# Patient Record
Sex: FEMALE | Race: WHITE | NOT HISPANIC OR LATINO
[De-identification: names, ages, dates, MRNs, and addresses within clinical notes are randomized per-mention and may not be internally consistent; named-entity substitution may affect disease eponyms.]

---
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
## 2020-03-12 ENCOUNTER — APPOINTMENT (OUTPATIENT)
Dept: PULMONOLOGY | Facility: CLINIC | Age: 20
End: 2020-03-12
Payer: COMMERCIAL

## 2020-03-12 VITALS
OXYGEN SATURATION: 100 % | BODY MASS INDEX: 17.61 KG/M2 | WEIGHT: 123 LBS | RESPIRATION RATE: 16 BRPM | TEMPERATURE: 96.7 F | HEART RATE: 62 BPM | DIASTOLIC BLOOD PRESSURE: 69 MMHG | SYSTOLIC BLOOD PRESSURE: 114 MMHG | HEIGHT: 70 IN

## 2020-03-12 DIAGNOSIS — Z78.9 OTHER SPECIFIED HEALTH STATUS: ICD-10-CM

## 2020-03-12 DIAGNOSIS — G47.21 CIRCADIAN RHYTHM SLEEP DISORDER, DELAYED SLEEP PHASE TYPE: ICD-10-CM

## 2020-03-12 PROCEDURE — 99204 OFFICE O/P NEW MOD 45 MIN: CPT

## 2020-03-16 PROBLEM — Z78.9 DOES NOT USE TOBACCO: Status: ACTIVE | Noted: 2020-03-16

## 2020-03-16 PROBLEM — G47.21 SLEEP-WAKE SCHEDULE DISORDER, DELAYED PHASE TYPE: Status: ACTIVE | Noted: 2020-03-16

## 2020-03-16 RX ORDER — DEXTROAMPHETAMINE SACCHARATE, AMPHETAMINE ASPARTATE, DEXTROAMPHETAMINE SULFATE, AND AMPHETAMINE SULFATE 3.75; 3.75; 3.75; 3.75 MG/1; MG/1; MG/1; MG/1
TABLET ORAL
Refills: 0 | Status: ACTIVE | COMMUNITY

## 2020-03-16 NOTE — HISTORY OF PRESENT ILLNESS
[Other: ___] : [unfilled] [Daytime Somnolence] : daytime somnolence [ESS: ___] : ESS score [unfilled] [Unintentional Sleep While Inactive] : unintentional sleep while inactive [Awakes Unrefreshed] : awakening unrefreshed [Hypersomnolence] : hypersomnolence [FreeTextEntry1] : This is a 20-year-old female with no significant past medical history who comes in for evaluation of hypersomnolence. The patient is accompanied by her mother. The patient states that for significant amount of time she's experienced some degree of daytime sleepiness. This goes as far back as a middle school where it was difficult to get her up and in time for  class. When she is able to sleep when she wants she prefers a later bedtime between 1 AM and 3 AM. At times she is able to sleep until 9 or 10:00 AM and feels a little more refreshed. If she is awoken around 6 or 7 AM, she feels groggy and is difficult to awaken.\par \par She has seen a psychologist and psychiatrist and was prescribed antidepressant medication which helped for short while. She ultimately discontinued this medication as he did not have a lasting effect. She was given a prescription for Adderall when she needs to perform at her highest level. [Snoring] : no snoring [Witnessed Apneas] : no witnessed sleep apnea [Frequent Nocturnal Awakening] : no nocturnal awakening [Unintentional Sleep while Active] : no unintentional sleep while active [Awakes with Headache] : no headache upon awakening [Awakening With Dry Mouth] : no dry mouth upon awakening [Recent  Weight Gain] : no recent weight gain [DIS] : no DIS [DMS] : no DMS [Unusual Sleep Behavior] : no unusual sleep behavior [Cataplexy] : no cataplexy [Sleep Paralysis] : no sleep paralysis [Hypnagogic Hallucinations] : no hallucinations when falling asleep [Hypnopompic Hallucinations] : no hallucinations when awakening

## 2020-03-16 NOTE — PROCEDURE
[FreeTextEntry1] : This is a 20-year-old female with no significant past medical history who comes in for evaluation of hypersomnolence. 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 I have advised her for the next 2 weeks to try to go to bed between 1 AM and 3 AM and wake up when her body allows her to wake up. After those 2 weeks she will try to restrict her time to roughly 8 hours. I've given her sleep logs to fill out which we will discuss when she comes back and sees me. We will discuss at that time how to restrict her bedtime in either March backwards or forwards for her preferred that time. There is no indication for any medication or further testing as of now.

## 2020-03-16 NOTE — REVIEW OF SYSTEMS
[EDS: ESS=____] : daytime somnolence: ESS=[unfilled] [Hypersomnolence] : sleeping much more than usual [Negative] : Psychiatric [Difficulty Initiating Sleep] : no difficulty falling asleep [Difficulty Maintaining Sleep] : no difficulty maintaining sleep [Lower Extremity Discomfort] : no lower extremity discomfort [Irresistible urge to move legs] : no irresistible urge to move legs because of lower extremity discomfort [Late day/ Evening symptoms] : no late day/evening symptoms [Sleep Disturbances due to LE symptoms] : ~T no sleep disturbances due to lower extremity symptoms [Unusual Sleep Behavior] : no unusual sleep behavior [Cataplexy] :  no cataplexy [Hypnogogic Hallucinations] : no hypnogogic hallucinations [Hypnopompic Hallucinations] : no hypnopompic hallucinations [Sleep Paralysis] : no sleep paralysis

## 2020-03-16 NOTE — PHYSICAL EXAM
[General Appearance - Well Developed] : well developed [Normal Appearance] : normal appearance [General Appearance - Well Nourished] : well nourished [Normal Oropharynx] : normal oropharynx [I] : I [Neck Appearance] : the appearance of the neck was normal [Neck Cervical Mass (___cm)] : no neck mass was observed [Apical Impulse] : the apical impulse was normal [Heart Rate And Rhythm] : heart rate was normal and rhythm regular [Heart Sounds] : normal S1 and S2 [Respiration, Rhythm And Depth] : normal respiratory rhythm and effort [Auscultation Breath Sounds / Voice Sounds] : lungs were clear to auscultation bilaterally [Abnormal Walk] : normal gait [Musculoskeletal - Swelling] : no joint swelling seen [Nail Clubbing] : no clubbing of the fingernails [Cyanosis, Localized] : no localized cyanosis [] : no rash [No Focal Deficits] : no focal deficits [Oriented To Time, Place, And Person] : oriented to person, place, and time [Impaired Insight] : insight and judgment were intact [Affect] : the affect was normal [FreeTextEntry2] : No edema

## 2020-03-16 NOTE — REASON FOR VISIT
[Consultation] : a consultation visit [Hypersomnolence] : hypersomnolence  [Parent] : parent [FreeTextEntry1] : Dr. Javier

## 2020-03-16 NOTE — CONSULT LETTER
[Dear  ___] : Dear  [unfilled], [Consult Letter:] : I had the pleasure of evaluating your patient, [unfilled]. [Please see my note below.] : Please see my note below. [Consult Closing:] : Thank you very much for allowing me to participate in the care of this patient.  If you have any questions, please do not hesitate to contact me. [Sincerely,] : Sincerely, [FreeTextEntry3] : Edwin Schneider DO, MPH\par Pulmonary, Critical Care, and Sleep Medicine\par  of Medicine\par [employer] [facility]

## 2021-03-09 ENCOUNTER — APPOINTMENT (OUTPATIENT)
Dept: GASTROENTEROLOGY | Facility: CLINIC | Age: 21
End: 2021-03-09

## 2021-08-27 ENCOUNTER — TRANSCRIPTION ENCOUNTER (OUTPATIENT)
Age: 21
End: 2021-08-27

## 2021-09-05 ENCOUNTER — TRANSCRIPTION ENCOUNTER (OUTPATIENT)
Age: 21
End: 2021-09-05

## 2021-12-22 ENCOUNTER — APPOINTMENT (OUTPATIENT)
Dept: RADIOLOGY | Facility: CLINIC | Age: 21
End: 2021-12-22
Payer: COMMERCIAL

## 2021-12-22 ENCOUNTER — OUTPATIENT (OUTPATIENT)
Dept: OUTPATIENT SERVICES | Facility: HOSPITAL | Age: 21
LOS: 1 days | End: 2021-12-22
Payer: COMMERCIAL

## 2021-12-22 DIAGNOSIS — S99.922S UNSPECIFIED INJURY OF LEFT FOOT, SEQUELA: ICD-10-CM

## 2021-12-22 PROCEDURE — 73630 X-RAY EXAM OF FOOT: CPT

## 2021-12-22 PROCEDURE — 73630 X-RAY EXAM OF FOOT: CPT | Mod: 26,LT

## 2023-01-17 ENCOUNTER — OUTPATIENT (OUTPATIENT)
Dept: OUTPATIENT SERVICES | Facility: HOSPITAL | Age: 23
LOS: 1 days | End: 2023-01-17
Payer: COMMERCIAL

## 2023-01-17 DIAGNOSIS — Z11.52 ENCOUNTER FOR SCREENING FOR COVID-19: ICD-10-CM

## 2023-01-17 LAB — SARS-COV-2 RNA SPEC QL NAA+PROBE: SIGNIFICANT CHANGE UP

## 2023-01-17 PROCEDURE — U0003: CPT

## 2023-01-17 PROCEDURE — C9803: CPT

## 2023-01-17 PROCEDURE — U0005: CPT

## 2023-01-19 ENCOUNTER — RESULT REVIEW (OUTPATIENT)
Age: 23
End: 2023-01-19

## 2023-01-19 ENCOUNTER — TRANSCRIPTION ENCOUNTER (OUTPATIENT)
Age: 23
End: 2023-01-19

## 2023-01-19 ENCOUNTER — OUTPATIENT (OUTPATIENT)
Dept: OUTPATIENT SERVICES | Facility: HOSPITAL | Age: 23
LOS: 1 days | End: 2023-01-19
Payer: COMMERCIAL

## 2023-01-19 VITALS
RESPIRATION RATE: 16 BRPM | DIASTOLIC BLOOD PRESSURE: 58 MMHG | WEIGHT: 110.01 LBS | SYSTOLIC BLOOD PRESSURE: 111 MMHG | OXYGEN SATURATION: 99 % | TEMPERATURE: 98 F | HEART RATE: 57 BPM | HEIGHT: 70 IN

## 2023-01-19 DIAGNOSIS — A69.20 LYME DISEASE, UNSPECIFIED: ICD-10-CM

## 2023-01-19 PROCEDURE — C1751: CPT

## 2023-01-19 PROCEDURE — 36573 INSJ PICC RS&I 5 YR+: CPT

## 2023-01-19 NOTE — ASU PATIENT PROFILE, ADULT - FALL HARM RISK - UNIVERSAL INTERVENTIONS
Bed in lowest position, wheels locked, appropriate side rails in place/Call bell, personal items and telephone in reach/Instruct patient to call for assistance before getting out of bed or chair/Non-slip footwear when patient is out of bed/[place] to call system/Physically safe environment - no spills, clutter or unnecessary equipment/Purposeful Proactive Rounding/Room/bathroom lighting operational, light cord in reach

## 2023-01-19 NOTE — PRE PROCEDURE NOTE - PRE PROCEDURE EVALUATION
Interventional Radiology    HPI: 23y Female with lyme disease here for PICC placement for IV abx.        Allergies:   Medications (Abx/Cardiac/Anticoagulation/Blood Products)      Data:    T(C): --  HR: --  BP: --  RR: --  SpO2: --    Lyme disease    SysAdmin_VstLnk        Exam  General: No acute distress  Chest: Non labored breathing             Plan: 23y Female presents for PICC placement.   -Risks/Benefits/alternatives explained with the patient  and witnessed informed consent obtained.

## 2023-01-19 NOTE — ASU PATIENT PROFILE, ADULT - WILL THE PATIENT ACCEPT THE PFIZER COVID-19 VACCINE IF ELIGIBLE AND IT IS AVAILABLE?
R flank pain radiating to RLQ and RLE. Possibly from kidney stone. Will start out with US to evaluate for appendicitis vs kidney stone vs ruptured ovarian cyst. if not diagnostic will consider MRI.
No

## 2023-01-19 NOTE — ASU DISCHARGE PLAN (ADULT/PEDIATRIC) - ASU DC SPECIAL INSTRUCTIONSFT
PICC Placement    Discharge Instructions  - You have had a PICC implanted in your arm.   - The PICC is ready for use.  - You may shower as long as the PICC and dressing remains dry.  -  No soaking or swimming until the PICC is removed or when the site is completely healed.  - Keep the area covered and dry for as long as the PICC remains in. It may be removed and changed by a nurse as needed.  - Do not perform any heavy lifting or put tension on the area for the next week or until the site is healed.  - You may resume your normal diet.  - You may resume your normal medications however you should wait 48 hours before restarting aspirin, plavix, or blood thinners.  - It is normal to experience some pain over the site for the next few days. You may take apply ice to the area (20 minutes on, 20 minutes off) and take Tylenol for that pain. Do not take more frequently than every 6 hours and do not exceed more than 3000mg of Tylenol in a 24 hour period.    Notify your primary physician and/or Interventional Radiology IMMEDIATELY if you experience any of the following       - Fever of 101F or 38C       - Chills or Rigors/ Shakes       - Swelling and/or Redness in the area around the port       - Worsening Pain       - Blood soaked bandages or worsening bleeding       - Lightheadedness and/or dizziness upon standing       - Chest Pain/ Tightness       - Shortness of Breath       - Difficulty walking    If you have a problem that you believe requires IMMEDIATE attention, please go to your NEAREST Emergency Room. If you believe your problem can safely wait until you speak to a physician, please call Interventional Radiology for any concerns.    Please feel free to contact us at [phone] if any problems arise. After 6PM, Monday through Friday, on weekends and on holidays, please call [phone] and ask for the radiology resident on call to be paged.

## 2023-01-19 NOTE — ASU PATIENT PROFILE, ADULT - FALL HARM RISK - FALLEN IN PAST
----- Message from Vidya Abreu MD sent at 2/3/2022  7:05 AM EST -----  Contact: [phone] ([phone]  No  ----- Message -----  From: Diego Eastman  Sent: 2/2/2022   4:10 PM EST  To: Vidya Abreu MD    Pt is wondering if you will fill out her [place] paperwork?     Thank you No

## 2024-03-20 ENCOUNTER — APPOINTMENT (OUTPATIENT)
Dept: SURGERY | Facility: CLINIC | Age: 24
End: 2024-03-20
Payer: COMMERCIAL

## 2024-03-20 VITALS
SYSTOLIC BLOOD PRESSURE: 121 MMHG | DIASTOLIC BLOOD PRESSURE: 76 MMHG | WEIGHT: 115 LBS | RESPIRATION RATE: 16 BRPM | OXYGEN SATURATION: 99 % | HEART RATE: 84 BPM | BODY MASS INDEX: 16.46 KG/M2 | HEIGHT: 70 IN | TEMPERATURE: 97.6 F

## 2024-03-20 DIAGNOSIS — K64.1 SECOND DEGREE HEMORRHOIDS: ICD-10-CM

## 2024-03-20 DIAGNOSIS — Z86.19 PERSONAL HISTORY OF OTHER INFECTIOUS AND PARASITIC DISEASES: ICD-10-CM

## 2024-03-20 DIAGNOSIS — K63.8219 SMALL INTESTINAL BACTERIAL OVERGROWTH, UNSPECIFIED: ICD-10-CM

## 2024-03-20 PROCEDURE — 99203 OFFICE O/P NEW LOW 30 MIN: CPT | Mod: 25

## 2024-03-20 PROCEDURE — 46600 DIAGNOSTIC ANOSCOPY SPX: CPT

## 2024-03-20 RX ORDER — VALACYCLOVIR 500 MG/1
500 TABLET, FILM COATED ORAL
Refills: 0 | Status: ACTIVE | COMMUNITY

## 2024-03-20 RX ORDER — ONABOTULINUMTOXINA 100 [USP'U]/1
100 INJECTION, POWDER, LYOPHILIZED, FOR SOLUTION INTRADERMAL; INTRAMUSCULAR
Qty: 1 | Refills: 4 | Status: ACTIVE | COMMUNITY
Start: 2024-03-20 | End: 1900-01-01

## 2024-03-20 NOTE — PHYSICAL EXAM
[Normal Breath Sounds] : Normal breath sounds [Normal Heart Sounds] : normal heart sounds [No Rash or Lesion] : No rash or lesion [Alert] : alert [Oriented to Person] : oriented to person [Oriented to Place] : oriented to place [Oriented to Time] : oriented to time [Calm] : calm [de-identified] : WNL [de-identified] : WNL [de-identified] : WNL [de-identified] : KRYSTAL [de-identified] : WNL [FreeTextEntry1] : Perianal inspection unremarkable.  Posterior anal canal tenderness on digital exam.  Sphincter spasm noted.  Anosocopy with enlarged internal hemorrhoids, especially in the right posterior and left lateral positions.  A posterior fissure with exposed internal sphincter was seen.  Anoscopy performed to evaluate anal canal.  No sedation required.

## 2024-03-20 NOTE — CONSULT LETTER
[Dear  ___] : Dear ~KEVIN, [Courtesy Letter:] : I had the pleasure of seeing your patient, [unfilled], in my office today. [Please see my note below.] : Please see my note below. [Consult Closing:] : Thank you very much for allowing me to participate in the care of this patient.  If you have any questions, please do not hesitate to contact me. [Sincerely,] : Sincerely, [FreeTextEntry2] : Dr. Vitaly Cruz [FreeTextEntry3] : Balwinder Matias M.D., F.CARL.C.S., F.A.S.C.R.S. Chief Colorectal Clinical Services, [facility]

## 2024-03-20 NOTE — ASSESSMENT
[FreeTextEntry1] : I have seen and evaluated patient and I have corroborated all nursing input into this note.  Patient has had hemorrhoid prolapse and now has pain and bleeding with bowel movements.  She has a posterior fissure and large internal hemorrhoids.  The pain appears to be from her fissure.  However, the bleeding can be from either her fissure, or her hemorrhoids, or both of these problems.  Therefore, since she has failed treatment with topical nitroglycerin, I recommended internal sphincter Botox injections in conjunction with internal hemorrhoid rubber band ligations (non-latex).  This procedure can be performed under sedation in the endoscopy suite.

## 2024-03-20 NOTE — HISTORY OF PRESENT ILLNESS
[FreeTextEntry1] : Oliva is a 23 y/o female being seen for consultation, fissure  Colonoscopy by Dr. Cruz 12/16/22 (change in bowel habits) - Normal colonoscopy Pathology: Ascending colon, Transverse colon, Descending colon, cecum - Colonic mucosa without significant changes.  No active colitis, granulomata, diagnostic features of microscopic colitis, or dysplasia present.    For 3 years pt was getting treated for SIBO, Lyme Disease and H.Pylori and from that was having chronic diarrhea.  Last year October her stool started getting more formed BMs, rectal bleeding occasionally.  Currently 2-3 times daily formed BMs.  Today pt reports having anal pain 3/10 worse with BM, uses Nitroglycerin for the past two weeks with relief.  The pain lingers for the whole day sometimes.  The patient also felt prolapsing tissue in the past.  Denies nausea, vomiting, fever or chills.

## 2024-04-16 ENCOUNTER — APPOINTMENT (OUTPATIENT)
Dept: SURGERY | Facility: CLINIC | Age: 24
End: 2024-04-16
Payer: COMMERCIAL

## 2024-04-16 PROCEDURE — 46505 CHEMODENERVATION ANAL MUSC: CPT

## 2024-04-16 PROCEDURE — 45990 SURG DX EXAM ANORECTAL: CPT | Mod: 59

## 2024-04-16 PROCEDURE — 46221 LIGATION OF HEMORRHOID(S): CPT

## 2024-05-30 ENCOUNTER — APPOINTMENT (OUTPATIENT)
Dept: SURGERY | Facility: CLINIC | Age: 24
End: 2024-05-30
Payer: COMMERCIAL

## 2024-05-30 VITALS
WEIGHT: 115 LBS | OXYGEN SATURATION: 100 % | HEIGHT: 70 IN | BODY MASS INDEX: 16.46 KG/M2 | RESPIRATION RATE: 18 BRPM | SYSTOLIC BLOOD PRESSURE: 125 MMHG | DIASTOLIC BLOOD PRESSURE: 89 MMHG | TEMPERATURE: 97.2 F | HEART RATE: 89 BPM

## 2024-05-30 DIAGNOSIS — Z09 ENCOUNTER FOR FOLLOW-UP EXAMINATION AFTER COMPLETED TREATMENT FOR CONDITIONS OTHER THAN MALIGNANT NEOPLASM: ICD-10-CM

## 2024-05-30 DIAGNOSIS — K60.2 ANAL FISSURE, UNSPECIFIED: ICD-10-CM

## 2024-05-30 DIAGNOSIS — K59.4 ANAL SPASM: ICD-10-CM

## 2024-05-30 PROCEDURE — 99213 OFFICE O/P EST LOW 20 MIN: CPT

## 2024-05-30 RX ORDER — NITROGLYCERIN 4 MG/G
0.4 OINTMENT RECTAL
Qty: 1 | Refills: 4 | Status: ACTIVE | COMMUNITY
Start: 2024-05-30 | End: 1900-01-01

## 2024-05-30 NOTE — HISTORY OF PRESENT ILLNESS
[FreeTextEntry1] : POLO is a 24 year old female here for s/p botox and RBL Right posterior hemorrhoid 04/16/24   Colonoscopy by Dr. Cruz 12/16/22 (change in bowel habits) - Normal colonoscopy Pathology: Ascending colon, Transverse colon, Descending colon, cecum - Colonic mucosa without significant changes. No active colitis, granulomata, diagnostic features of microscopic colitis, or dysplasia present.  Today pt reports having on and off rectal pain and bleeding onto tp with BMs, pain lingers for a couple of hours for about 2 weeks, was using nitroglycerin with little relief.  Hard BMs 1-2 times sometimes straining, did take Miralax for 1.5 weeks, takes fiber supplements.

## 2024-05-30 NOTE — ASSESSMENT
[FreeTextEntry1] : Patient without any response to Botox.  Therefore, I recommended fissurectomy with partial lateral internal sphincterotomy.  Indications, risks, benefits, alternatives reviewed including but not limited to bleeding, infection, recurrence, failure, and anal incontinence.  The patient's mother was on the speaker phone during the discussion and all questions were answered.  The patient will make a decision regarding surgery and she will contact my office.

## 2024-05-30 NOTE — PHYSICAL EXAM
[FreeTextEntry1] : Perianal inspection unremarkable.  Digital exam limited because of severe spasm and posterior anal canal tenderness.

## 2024-06-12 ENCOUNTER — APPOINTMENT (OUTPATIENT)
Dept: SURGERY | Facility: CLINIC | Age: 24
End: 2024-06-12
Payer: COMMERCIAL

## 2025-03-02 ENCOUNTER — NON-APPOINTMENT (OUTPATIENT)
Age: 25
End: 2025-03-02